# Patient Record
(demographics unavailable — no encounter records)

---

## 2022-07-05 RX ORDER — INSULIN GLARGINE 100 [IU]/ML
INJECTION, SOLUTION SUBCUTANEOUS
COMMUNITY

## 2022-07-15 LAB
ANION GAP SERPL CALC-SCNC: 13 MMOL/L (ref 2–17)
BASOPHILS # BLD AUTO: 0 X10E3/MCL (ref 0–0.2)
BASOPHILS NFR BLD AUTO: 0.4 % (ref 0–2)
BUN SERPL-MCNC: 13 MG/DL (ref 6–20)
CALCIUM SERPL-MCNC: 9.5 MG/DL (ref 8.6–10)
CHLORIDE SERPL-SCNC: 98 MMOL/L (ref 98–107)
CREAT SERPL-MCNC: 0.7 MG/DL (ref 0.7–1.3)
D DIMER: 0.41 CD:295178345 (ref 0.19–0.51)
DEPRECATED HCO3 PLAS-SCNC: 27 MMOL/L (ref 22–29)
EOSINOPHIL # BLD AUTO: 0.2 X10E3/MCL (ref 0–0.5)
EOSINOPHIL NFR BLD AUTO: 4.1 % (ref 0–7)
ERYTHROCYTE [DISTWIDTH] IN BLOOD BY AUTOMATED COUNT: 11.8 % (ref 11–16)
GFR SERPL CREATININE-BSD FRML MDRD: 136 ML/MIN/1.73M²
GLUCOSE SERPL-MCNC: 182 MG/DL (ref 70–99)
HCT VFR BLD AUTO: 43.6 % (ref 38–52)
HGB BLD-MCNC: 15.1 G/DL (ref 13–17.3)
IMMATURE GRANS (ABS): 0.01 X10E3/MCL (ref 0–0.06)
IMMATURE GRANULOCYTES: 0.2 % (ref 0–0.6)
LYMPHOCYTES # SPEC AUTO: 2 X10E3/MCL (ref 1–3.2)
LYMPHOCYTES NFR BLD AUTO: 43.2 % (ref 15–45)
MCH RBC QN AUTO: 29.5 PG (ref 27–34.5)
MCHC RBC AUTO-ENTMCNC: 34.6 G/DL (ref 32–36)
MCV RBC AUTO: 85.3 FL (ref 84–100)
MONOCYTES # BLD AUTO: 0.5 X10E3/MCL (ref 0.3–1)
MONOCYTES NFR BLD AUTO: 9.8 % (ref 4–12)
NEUTROPHILS # BLD AUTO: 1.9 X10E3/MCL (ref 1.6–7.3)
NEUTROPHILS NFR BLD AUTO: 42.3 % (ref 42–74)
OSMOLALITY SERPL CALC.SUM OF ELEC: 280 MOSM/KG (ref 270–287)
PLATELET # BLD AUTO: 270 X10E3/MCL (ref 140–440)
PMV BLD AUTO: 10.1 FL (ref 7.2–13.2)
POTASSIUM SERPL-SCNC: 4.3 MMOL/L (ref 3.5–5.3)
RBC # BLD AUTO: 5.11 X10E6/MCL (ref 4–5.6)
SODIUM SERPL-SCNC: 138 MMOL/L (ref 135–145)
TROPONIN T SERPL-MCNC: <0.01 NG/ML (ref 0–0.01)
WBC # BLD AUTO: 4.6 X10E3/MCL (ref 3.8–10.6)

## 2022-10-19 NOTE — ED PROVIDER NOTES
Chest pain        Patient:   Missy Gomez            MRN: [id number]            FIN: [id number]               Age:   23 years     Sex:  Male     :  2003   Associated Diagnoses:   COVID; Chest pain   Author:   Kasia Jo      Basic Information   Time seen: Provider Seen ()   ED Provider/Time:    QUANG BRISENO / 07/15/2022 15:39  . Additional information: Chief Complaint from Nursing Triage Note   Chief Complaint  Chief Complaint: arrives ambulatory speaking in full sentences. states covid+ monday. now having sharp, intermittment midline CP accompanied with SOB. (07/15/22 14:13:00). History of Present Illness   66-year-old male presents with type 1 diabetes for evaluation of chest pain and shortness of breath. He was diagnosed with COVID 5 days ago. Chest pain and shortness of breath started last night. It is intermittent. There is randomly, can occur at rest.  No specific triggers with exertion or eating. Denies palpitations, lightheadedness, nausea or vomiting, sore throat. Was originally seen at urgent care and referred here for abnormality of EKG. Chest x-ray performed while at urgent care. .        Review of Systems   Constitutional symptoms:  No fever, no chills. Respiratory symptoms:  Shortness of breath, cough. Cardiovascular symptoms:  Chest pain, no palpitations, no syncope. Gastrointestinal symptoms:  No abdominal pain, no nausea, no vomiting. Neurologic symptoms:  No headache, no dizziness. Additional review of systems information: All systems reviewed as documented in chart. Health Status   Allergies: Allergic Reactions (Selected)  No Known Medication Allergies. Past Medical/ Family/ Social History   Surgical history: Reviewed as documented in chart. Family history: Reviewed as documented in chart. Social history: Reviewed as documented in chart.    Problem list:    Active Problems (2)  COVID-19   DM (diabetes mellitus)   , per nurse's notes. Physical Examination               Vital Signs   Vital Signs   8/87/1606 04:17 EDT Systolic Blood Pressure 932 mmHg    Diastolic Blood Pressure 97 mmHg  HI    Peripheral Pulse Rate 73 bpm    Heart Rate Monitored 73 bpm    Respiratory Rate 16 br/min    Mean Arterial Pressure, Cuff 104 mmHg  HI    SpO2 100 %   1/47/2814 75:54 EDT Systolic Blood Pressure 629 mmHg    Diastolic Blood Pressure 72 mmHg    Heart Rate Monitored 84 bpm    Respiratory Rate 18 br/min    SpO2 100 %   1/06/5351 70:83 EDT Systolic Blood Pressure 577 mmHg  HI    Diastolic Blood Pressure 90 mmHg    Temperature Oral 36.6 degC    Heart Rate Monitored 86 bpm    Respiratory Rate 15 br/min    SpO2 99 %   . Measurements   7/15/2022 14:17 EDT Body Mass Index est scarlett 29.42 kg/m2    Body Mass Index Measured 29.42 kg/m2   7/15/2022 14:13 EDT Height/Length Measured 185 cm    Weight Dosing 100.7 kg   . Oxygen saturation. General:  Alert, no acute distress. Skin:  Warm, dry. Head:  Normocephalic, atraumatic. Cardiovascular:  Regular rate and rhythm, No murmur. Respiratory:  Lungs are clear to auscultation, respirations are non-labored, breath sounds are equal.    Neurological:  Alert and oriented to person, place, time, and situation. Psychiatric:  Cooperative, appropriate mood & affect. Medical Decision Making   Differential Diagnosis:  Pulmonary embolism, atypical chest pain, pneumonia, costochondritis, pleurisy. Rationale:  79-year-old male with diabetes who was diagnosed with COVID 5 days ago presents for evaluation of abnormal EKG and chest pain. Was seen in urgent care prior to arrival and referred to the emergency room. Chest x-ray performed at urgent care did not demonstrate any pneumonia or abnormalities. This was reviewed in our system. Patient's vitals are stable, his exam is benign. He is resting comfortably, no respiratory distress, daughter and his girlfriend on the phone.   EKG with inverted T waves, this was reviewed with Dr. Todd Davis likely patient variant. Labs are unremarkable, negative troponin, normal D-dimer. Likely costochondritis secondary to recent viral illness. Will treat with NSAIDs and cough suppressant. They expressed understanding all questions answered. .   Documents reviewed:  Emergency department nurses' notes, prior records. Electrocardiogram:  Normal sinus rhythm, no ectopy, normal WI & QRS intervals, T inversion II, III. Results review:  Lab results : Lab View   7/15/2022 17:42 EDT Estimated Creatinine Clearance 211.25 mL/min   7/15/2022 16:19 EDT WBC 4.6 x10e3/mcL    RBC 5.11 x10e6/mcL    Hgb 15.1 g/dL    HCT 43.6 %    MCV 85.3 fL    MCH 29.5 pg    MCHC 34.6 g/dL    RDW 11.8 %    Platelet 472 N58Y7/BHZ    MPV 10.1 fL    Neutro Auto 42.3 %    Neutro Absolute 1.9 x10e3/mcL    Immature Grans Percent 0.2 %    Immature Grans Absolute 0.01 x10e3/mcL    Lymph Auto 43.2 %    Lymph Absolute 2.0 x10e3/mcL    Mono Auto 9.8 %    Mono Absolute 0.5 x10e3/mcL    Eosinophil Percent 4.1 %    Eos Absolute 0.2 x10e3/mcL    Basophil Auto 0.4 %    Baso Absolute 0.0 x10e3/mcL    D Dimer 0.41 mcg/mL FEU    Sodium Lvl 138 mmol/L    Potassium Lvl 4.3 mmol/L    Chloride 98 mmol/L    CO2 27 mmol/L    Glucose Random 182 mg/dL  HI    BUN 13 mg/dL    Creatinine Lvl 0.7 mg/dL    AGAP 13 mmol/L    Osmolality Calc 280 mOsm/kg    Calcium Lvl 9.5 mg/dL    eGFR 136 mL/min/1.73mÂ²    Trop T Quant <0.010 ng/mL   . Radiology results:  Rad Results (ST)   No qualifying data available. .      Impression and Plan   Diagnosis   COVID (DQN58-UN U07.1, Discharge, Medical)   Chest pain (NKH15-BF R07.9, Discharge, Medical)   Plan   Condition: Stable. Disposition: Discharged: Time  7/15/2022 17:45:00, to home. Patient was given the following educational materials: Costochondritis, Easy-to-Read.     Follow up with: PCP or clinic Within 1 to 2 days Follow-up with primary care provider in 1-2 days f symptoms worsening or not improving. Return to ED if symptoms worsen. .    Counseled: Patient, Family, Regarding diagnosis, Regarding diagnostic results, Regarding treatment plan, Patient indicated understanding of instructions. Signature Line     Electronically Signed on 07/15/2022 05:49 PM EDT   ________________________________________________   Florecita Tian               Modified by: Florecita Tian on 07/15/2022 05:46 PM EDT      Modified by: Florecita Tian on 07/15/2022 05:49 PM EDTAddendum by Dyana Johnson on July 15, 2022 22:16 EDT               The patients history, exam findings, diagnostics, and a summary of any interventions or procedures was reviewed in detail with PA.   Signature Line     Electronically Signed on 07/15/2022 10:16 PM EDT   ________________________________________________   Gillian HARRIS               Modified by: Gillian HARRIS on 07/15/2022 10:16 PM EDT

## 2022-10-19 NOTE — ED NOTES
ED Patient Summary       ;          [facility]  [address], [place], [address] [place] [address]  [phone]  Discharge Instructions (Patient)  Ashley Ulloa  :  2003                   MRN: [id number]                   FIN: [id number]  Reason For Visit: Chest pain; SOB - Shortness of breath; Chest pain; ABN EKG  Final Diagnosis: COVID; Chest pain     Visit Date: 7/15/2022 14:08:00  Address: [facility]  Phone: [phone]     Emergency Department Providers:         Primary Physician:      Efra England would like to thank you for allowing us to assist you with your healthcare needs. The following includes patient education materials and information regarding your injury/illness. Follow-up Instructions: You were seen today on an emergency basis. Please contact your primary care doctor for a follow up appointment. If you received a referral to a specialist doctor, it is important you follow-up as instructed. It is important that you call your follow-up doctor to schedule and confirm the location of your next appointment. Your doctor may practice at multiple locations. The office location of your follow-up appointment may be different to the one written on your discharge instructions. If you do not have a primary care doctor, please call [phone] for help in finding a Korey Crowell. [facility] Provider. For help in finding a specialist doctor, please call ..[phone]. If your condition gets worse before your follow-up with your primary care doctor or specialist, please return to the Emergency Department. Coronavirus 2019 (COVID-19) Reminders:     Patients age 15 - 24, with parental consent, and over age 25 can make an appointment for a COVID-19 vaccine.  Patients can contact their [facility] doctors' offices to schedule an appointment to receive the COVID-19 vaccine. Patients who do not have a [address] [facility] physician can call [phone] to schedule vaccination appointments. Follow Up Appointments:  Primary Care Provider:     Name: Michele CARVER     Phone: [phone]                 With: Address: When:   PCP or clinic  Within 1 to 2 days   Comments: Follow-up with primary care provider in 1-2 days f symptoms worsening or not improving. Return to ED if symptoms worsen. [address] SERVICES%>       Allergy Info: No Known Medication Allergies     Discharge Additional Information          Discharge Patient 07/15/22 17:46:00 EDT      Patient Education Materials:        Costochondritis      Costochondritis is irritation and swelling (inflammation) of the tissue that connects the ribs to the breastbone (sternum). This tissue is called cartilage. Costochondritis causes pain in the front of the chest. Usually, the pain:   Starts slowly. Is in more than one rib. What are the causes? The exact cause of this condition is not always known. It results from stress on the tissue in the affected area. The cause of this stress could be:   Chest injury. Exercise or activity, such as lifting. Very bad coughing. What increases the risk? You are more likely to develop this condition if you:   Are female. Are 32?36 years old. Recently started a new exercise or work activity. Have low levels of vitamin D. Have a condition that makes you cough often. What are the signs or symptoms? The main symptom of this condition is chest pain. The pain:   Usually starts slowly and can be sharp or dull. Gets worse with deep breathing, coughing, or exercise. Gets better with rest.      May be worse when you press on the affected area of your ribs and breastbone. How is this treated? This condition usually goes away on its own over time.  Your doctor may prescribe an NSAID, such as ibuprofen. This can help reduce pain and inflammation. Treatment may also include:   Resting and avoiding activities that make pain worse. Putting heat or ice on the painful area. Doing exercises to stretch your chest muscles. If these treatments do not help, your doctor may inject a numbing medicine to help relieve the pain. Follow these instructions at home:    Managing pain, stiffness, and swelling         If told, put ice on the painful area. To do this:  ? Put ice in a plastic bag.    ? Place a towel between your skin and the bag.    ? Leave the ice on for 20 minutes, 2?3 times a day. If told, put heat on the affected area. Do this as often as told by your doctor. Use the heat source that your doctor recommends, such as a moist heat pack or a heating pad.  ? Place a towel between your skin and the heat source. ? Leave the heat on for 20?30 minutes. ? Take off the heat if your skin turns bright red. This is very important if you cannot feel pain, heat, or cold. You may have a greater risk of getting burned. Activity     Rest as told by your doctor. Do not do anything that makes your pain worse. This includes any activities that use chest, belly (abdomen), and side muscles. Do not lift anything that is heavier than 10 lb (4.5 kg), or the limit that you are told, until your doctor says that it is safe. Return to your normal activities as told by your doctor. Ask your doctor what activities are safe for you. General instructions     Take over-the-counter and prescription medicines only as told by your doctor. Keep all follow-up visits as told by your doctor. This is important. Contact a doctor if:     You have chills or a fever. Your pain does not go away or it gets worse. You have a cough that does not go away. Get help right away if:     You are short of breath.      You have very bad chest pain that is not helped by medicines, heat, or ice. These symptoms may be an emergency. Do not wait to see if the symptoms will go away. Get medical help right away. Call your local emergency services (911 in the [place] Do not drive yourself to the hospital.      Summary     Costochondritis is irritation and swelling (inflammation) of the tissue that connects the ribs to the breastbone (sternum). This condition causes pain in the front of the chest.     Treatment may include medicines, rest, heat or ice, and exercises. This information is not intended to replace advice given to you by your health care provider. Make sure you discuss any questions you have with your health care provider. Document Revised: 10/30/2020 Document Reviewed: 10/30/2020  [place] Patient Education ? [facility]      ---------------------------------------------------------------------------------------------------------------------  [facility] allows patients to review your COVID and other test results as well as discharge documents from any [facility]. [facility], Emergency Department, surgical center or outpatient lab. Test results are typically available 36 hours after the test is completed. [address] encourages you to self-enroll in the [facility] Patient Portal.     To begin your self-enrollment process, please visit [email or web address] Under [facility], click on Sign up now. NOTE: You must be 16 years and older to use [facility] Self-Enroll online. If you are a parent, caregiver, or guardian; you need an invite to access your childs or dependents health records. To obtain an invite, contact the Medical Records department at [phone] Monday through Friday, 8-4:30, select option 3 . If we receive your call afterhours, we will return your call the next business day.      If you have issues trying to create or access your account, contact [facility] support at [phone] available 7 days a week 24 hours a day.      Comment: [General Appearance - Alert] : alert [General Appearance - In No Acute Distress] : in no acute distress [] : no respiratory distress [Skin Color & Pigmentation] : normal skin color and pigmentation [Oriented To Time, Place, And Person] : oriented to person, place, and time [Normal] : normal heart rate and rhythm, normal S1 and S2, and no murmurs present [No UE Edema] : there is no upper extremity edema [de-identified] : left breast scar well healed

## 2022-10-19 NOTE — ED NOTES
ED Triage Note       ED Secondary Triage Entered On:  7/15/2022 16:12 EDT    Performed On:  7/15/2022 16:12 EDT by Bryce Jeronimo               General Information   Barriers to Learning :   None evident   ED Home Meds Section :   Document assessment   [facility] ED Fall Risk Section :   Document assessment   ED Advance Directives Section :   Document assessment   ED Palliative Screen :   N/A (prefilled for <64yo)   Bryce Jeronimo - 7/15/2022 16:12 EDT   (As Of: 7/15/2022 16:15:07 EDT)   Problems(Active)    COVID-19 (SNOMED CT  :0757986093 )  Name of Problem:   LPKWH-22 ; Recorder:   Rigo Jimenez RN, Guanaco Palmer; Confirmation:   Confirmed ; Classification:   Patient Stated ; Code:   [id number] ; Contributor System:   [place] [employer] ; Last Updated:   7/15/2022 14:15 EDT ; Life Cycle Date:   7/15/2022 ; Life Cycle Status:   Active ; Vocabulary:   SNOMED CT        DM (diabetes mellitus) (SNOMED CT  :026440949 )  Name of Problem:   DM (diabetes mellitus) ; Recorder:   Bryce Jeronimo; Confirmation:   Confirmed ; Classification:   Patient Stated ; Code:   [id number] ; Contributor System:   PowerChart ; Last Updated:   7/15/2022 16:15 EDT ;  Life Cycle Date:   7/15/2022 ; Life Cycle Status:   Active ; Vocabulary:   SNOMED CT          Diagnoses(Active)    Chest pain  Date:   7/15/2022 ; Diagnosis Type:   Reason For Visit ; Confirmation:   Complaint of ; Clinical Dx:   Chest pain ; Classification:   Medical ; Clinical Service:   Non-Specified ; Code:   PNED ; Probability:   0 ; Diagnosis Code:   [id number]      SOB - Shortness of breath  Date:   7/15/2022 ; Diagnosis Type:   Reason For Visit ; Confirmation:   Complaint of ; Clinical Dx:   SOB - Shortness of breath ; Classification:   Medical ; Clinical Service:   Non-Specified ; Code:   PNED ; Probability:   0 ; Diagnosis Code:   [id number]             -    Procedure History   (As Of: 7/15/2022 16:15:07 EDT)     [facility]

## 2022-10-19 NOTE — ED NOTES
ED Triage Note       ED Triage Adult Entered On:  7/15/2022 14:17 EDT    Performed On:  7/15/2022 14:13 EDT by Aminah Bright RN, Kimberlee ROSAS               Triage   Numeric Rating Pain Scale :   0 = No pain   Chief Complaint :   arrives ambulatory speaking in full sentences. states covid+ monday. now having sharp, intermittment midline CP accompanied with SOB.     [employer] Mode of Arrival :   Private vehicle   Infectious Disease Documentation :   Document assessment   Temperature Oral :   36.6 degC(Converted to: 97.9 degF)    Heart Rate Monitored :   86 bpm   Respiratory Rate :   15 br/min   Systolic Blood Pressure :   150 mmHg (HI)    Diastolic Blood Pressure :   90 mmHg   SpO2 :   99 %   Oxygen Therapy :   Room air   Patient presentation :   None of the above   Chief Complaint or Presentation suggest infection :   No   Dosing Weight Obtained By :   Measured   Weight Dosing :   100.7 kg(Converted to: 222 lb 0 oz)    Height :   185 cm(Converted to: 6 ft 1 in)    Body Mass Index Dosing :   29 kg/m2   Jonathan Hi - 7/15/2022 14:13 EDT   DCP GENERIC CODE   Tracking Acuity :   3   Tracking Group :   [facility]   Jonathan Sussy - 7/15/2022 14:13 EDT   ED General Section :   Document assessment   Pregnancy Status :   N/A   ED Allergies Section :   Document assessment   ED Reason for Visit Section :   Document assessment   Jonathan Sussy - 7/15/2022 14:13 EDT   ID Risk Screen Symptoms   Recent Travel History :   No recent travel   TB Symptom Screen :   No symptoms   Last 90 days COVID-19 ID :   No   Close Contact with COVID-19 ID :   No   Last 14 days COVID-19 ID :   Yes - Detected (positive)   C. diff Symptom/History ID :   Neither of the above   Patient Pregnant :   None of the above   Aminah Bright RN, Loki ROSAS - 7/15/2022 14:13 EDT   Allergies   (As Of: 7/15/2022 14:17:58 EDT)   Allergies (Active)   No Known Medication Allergies  Estimated Onset Date:   Unspecified ; Created ByMellisa Peralta DEEPTI, Tiffany ROSAS; Reaction Status:   Active ; Category:   Drug ; Substance:   No Known Medication Allergies ; Type: Allergy ; Updated By:   Dhruv Snow RN, [employer]; Reviewed Date:   7/15/2022 14:14 EDT        Psycho-Social   Last 3 mo, thoughts killing self/others :   Patient denies   Right click within box for Suspected Abuse policy link. :   None   Feels Safe Where Live :   Yes   ED Behavioral Activity Rating Scale :   4 - Quiet and awake (normal level of activity)   Naomie Ohara RN, [employer]ivis Prime - 7/15/2022 14:13 EDT   ED Reason for Visit   (As Of: 7/15/2022 14:17:58 EDT)   Problems(Active)    COVID-19 (SNOMED CT  :4153871283 )  Name of Problem:   MARLYG-71 ; Recorder:   Dhruv Snow RN, [facility]; Confirmation:   Confirmed ; Classification:   Patient Stated ; Code:   [id number] ; Contributor System:   [employer] ; Last Updated:   7/15/2022 14:15 EDT ;  Life Cycle Date:   7/15/2022 ; Life Cycle Status:   Active ; Vocabulary:   SNOMED CT          Diagnoses(Active)    Chest pain  Date:   7/15/2022 ; Diagnosis Type:   Reason For Visit ; Confirmation:   Complaint of ; Clinical Dx:   Chest pain ; Classification:   Medical ; Clinical Service:   Non-Specified ; Code:   PNED ; Probability:   0 ; Diagnosis Code:   [id number]      SOB - Shortness of breath  Date:   7/15/2022 ; Diagnosis Type:   Reason For Visit ; Confirmation:   Complaint of ; Clinical Dx:   SOB - Shortness of breath ; Classification:   Medical ; Clinical Service:   Non-Specified ; Code:   PNED ; Probability:   0 ; Diagnosis Code:   [id number]

## 2022-10-19 NOTE — DISCHARGE SUMMARY
ED Clinical Summary                         [facility]  [address] [facility], [address] [phone]           PERSON INFORMATION  Name: Leesa Abarca Age:  23 Years : 2003   Sex: Male Language: English PCP: Sol CARVER   Marital Status:  Single Phone: [phone] Med Service: MED-Medicine   MRN:  [id number] Acct# [de-identified] Arrival: 7/15/2022 14:08:00   Visit Reason: Chest pain; SOB - Shortness of breath; Chest pain; ABN EKG Acuity: 3 LOS: 000 04:08   Address:      [place] [address]  Diagnosis:      COVID; Chest pain  Printed Prescriptions: Allergies      No Known Medication Allergies      Medications Administered During Visit:        Patient Medication List:              No Medications Documented         Major Tests and Procedures: The following procedures and tests were performed during your ED visit. COMMONPROCEDURES%>  COMMON PROCEDURESCOMMENTS%>          Laboratory Orders  Name Status Details   BMP Completed Blood, Stat, ST - Stat, 07/15/22 15:51:00 EDT, 07/15/22 15:51:00 EDT, FINN Zheng LUNDY-PA, Print label Y/N   CBCDIFF Completed Blood, Stat, ST - Stat, 07/15/22 15:51:00 EDT, 07/15/22 15:51:00 EDT, FINN Zheng LUNDY-PA, Print label Y/N   D Dimer Completed Blood, Stat, ST - Stat, 07/15/22 15:51:00 EDT, 07/15/22 15:51:00 EDT, FINN Zheng LUNDY-PA, Print label Y/N   Trop T Completed Blood, Stat, ST - Stat, 07/15/22 15:51:00 EDT, 07/15/22 15:51:00 EDT, FINN Zheng LUNDY-PA, Print label Y/N               Radiology Orders  No radiology orders were placed. Patient Care Orders  Name Status Details   Blood Pressure Completed 07/15/22 15:51:00 EDT, Once, 07/15/22 15:51:00 EDT, Check BP both arms   COVID-19 Status Ordered 07/15/22 14:08:19 EDT, NOT VALID FOR pharmacy, laboratory, radiology. , 07/15/22 14:08:19 EDT, COVID-19 Detected   Cardiac/NIBP/Pulse Ox Monitoring Completed 07/15/22 15:51:00 EDT, This message can only be seen by Nursing, it is not visible to Pharmacy, Laboratory, or Radiology. , 07/15/22 15:51:00 EDT, 07/15/22 15:51:00 EDT, Once   Discharge Patient Ordered 07/15/22 17:46:00 EDT   ED Assessment Adult Completed 07/15/22 14:17:59 EDT, 07/15/22 14:17:59 EDT   ED Only Oxygen Therapy Ordered 07/15/22 15:51:00 EDT, STAT 1 hour or less, 07/15/22 15:51:00 EDT, Keep SAT > 92%   ED Secondary Triage Completed 07/15/22 14:17:59 EDT, 07/15/22 14:17:59 EDT   ED Triage Adult Completed 07/15/22 14:08:19 EDT, 07/15/22 14:08:19 EDT   Place Isolation Order Ordered 07/15/22 14:08:20 EDT, 07/15/22 14:08:20 EDT   Saline Lock Insert Completed 07/15/22 15:51:00 EDT, Once, 07/15/22 15:51:00 EDT             PROVIDER INFORMATION               Provider Role Assigned Nadia Macdonald ED MidLevel 7/15/2022 15:39:37    Teresa Mass ED Nurse 7/15/2022 15:52:05        Attending Physician:  QUANG Bunn     Consulting Doc       VITALS INFORMATION  Vital Sign Triage Latest   Temp Oral ORAL_1%>36.6 degC ORAL%>36.6 degC   Temp Temporal TEMPORAL_1%> TEMPORAL%>   Temp Intravascular INTRAVASCULAR_1%> INTRAVASCULAR%>   Temp Axillary AXILLARY_1%> AXILLARY%>   Temp Rectal RECTAL_1%> RECTAL%>   02 Sat 99 % 99 %   Respiratory Rate RATE_1%>15 br/min RATE%>16 br/min   Peripheral Pulse Rate PULSE RATE_1%>73 bpm PULSE RATE%>72 bpm   Apical Heart Rate HEART RATE_1%> HEART RATE%>   Blood Pressure BLOOD PRESSURE_1%>/ BLOOD PRESSURE_1%>90 mmHg BLOOD PRESSURE%>129 mmHg / BLOOD PRESSURE%>80 mmHg                 Immunizations      No Immunizations Documented This Visit          DISCHARGE INFORMATION   Discharge Disposition: H Outpt-Sent Home   Discharge Location:    Home   Discharge Date and Time:    7/15/2022 18:16:00   ED Checkout Date and Time:    7/15/2022 18:16:00     DEPART REASON INCOMPLETE INFORMATION               Depart Action Incomplete Reason   Interactive View/I&O Recently assessed               Problems      No Problems Documented              Smoking Status      No Smoking Status Documented         PATIENT EDUCATION INFORMATION  Instructions:       Costochondritis, Easy-to-Read     Follow up:                    With: Address: When:   PCP or clinic  Within 1 to 2 days   Comments: Follow-up with primary care provider in 1-2 days f symptoms worsening or not improving. Return to ED if symptoms worsen. ED PROVIDER DOCUMENTATION     Patient:   Cresencio Gold            MRN: [id number]            FIN: [id number]               Age:   23 years     Sex:  Male     :  2003   Associated Diagnoses:   COVID; Chest pain   Author:   Francois Avila      Basic Information   Time seen: Provider Seen (ST)   ED Provider/Time:    QUANG BRISENO / 07/15/2022 15:39  . Additional information: Chief Complaint from Nursing Triage Note   Chief Complaint  Chief Complaint: arrives ambulatory speaking in full sentences. states covid+ monday. now having sharp, intermittment midline CP accompanied with SOB. (07/15/22 14:13:00). History of Present Illness   26-year-old male presents with type 1 diabetes for evaluation of chest pain and shortness of breath. He was diagnosed with COVID 5 days ago. Chest pain and shortness of breath started last night. It is intermittent. There is randomly, can occur at rest.  No specific triggers with exertion or eating. Denies palpitations, lightheadedness, nausea or vomiting, sore throat. Was originally seen at urgent care and referred here for abnormality of EKG. Chest x-ray performed while at urgent care. .        Review of Systems   Constitutional symptoms:  No fever, no chills. Respiratory symptoms:  Shortness of breath, cough. Cardiovascular symptoms:  Chest pain, no palpitations, no syncope. Gastrointestinal symptoms:  No abdominal pain, no nausea, no vomiting.     Neurologic symptoms:  No headache, no dizziness. Additional review of systems information: All systems reviewed as documented in chart. Health Status   Allergies: Allergic Reactions (Selected)  No Known Medication Allergies. Past Medical/ Family/ Social History   Surgical history: Reviewed as documented in chart. Family history: Reviewed as documented in chart. Social history: Reviewed as documented in chart. Problem list:    Active Problems (2)  COVID-19   DM (diabetes mellitus)   , per nurse's notes. Physical Examination               Vital Signs   Vital Signs   2/60/8933 51:04 EDT Systolic Blood Pressure 177 mmHg    Diastolic Blood Pressure 97 mmHg  HI    Peripheral Pulse Rate 73 bpm    Heart Rate Monitored 73 bpm    Respiratory Rate 16 br/min    Mean Arterial Pressure, Cuff 104 mmHg  HI    SpO2 100 %   1/69/6939 61:95 EDT Systolic Blood Pressure 300 mmHg    Diastolic Blood Pressure 72 mmHg    Heart Rate Monitored 84 bpm    Respiratory Rate 18 br/min    SpO2 100 %   9/17/8631 48:73 EDT Systolic Blood Pressure 720 mmHg  HI    Diastolic Blood Pressure 90 mmHg    Temperature Oral 36.6 degC    Heart Rate Monitored 86 bpm    Respiratory Rate 15 br/min    SpO2 99 %   . Measurements   7/15/2022 14:17 EDT Body Mass Index est scarlett 29.42 kg/m2    Body Mass Index Measured 29.42 kg/m2   7/15/2022 14:13 EDT Height/Length Measured 185 cm    Weight Dosing 100.7 kg   . Oxygen saturation. General:  Alert, no acute distress. Skin:  Warm, dry. Head:  Normocephalic, atraumatic. Cardiovascular:  Regular rate and rhythm, No murmur. Respiratory:  Lungs are clear to auscultation, respirations are non-labored, breath sounds are equal.    Neurological:  Alert and oriented to person, place, time, and situation. Psychiatric:  Cooperative, appropriate mood & affect. Medical Decision Making   Differential Diagnosis:  Pulmonary embolism, atypical chest pain, pneumonia, costochondritis, pleurisy. Rationale:  17-year-old male with diabetes who was diagnosed with COVID 5 days ago presents for evaluation of abnormal EKG and chest pain. Was seen in urgent care prior to arrival and referred to the emergency room. Chest x-ray performed at urgent care did not demonstrate any pneumonia or abnormalities. This was reviewed in our system. Patient's vitals are stable, his exam is benign. He is resting comfortably, no respiratory distress, daughter and his girlfriend on the phone. EKG with inverted T waves, this was reviewed with Dr. Nannette Moses likely patient variant. Labs are unremarkable, negative troponin, normal D-dimer. Likely costochondritis secondary to recent viral illness. Will treat with NSAIDs and cough suppressant. They expressed understanding all questions answered. .   Documents reviewed:  Emergency department nurses' notes, prior records. Electrocardiogram:  Normal sinus rhythm, no ectopy, normal FL & QRS intervals, T inversion II, III. Results review:  Lab results : Lab View   7/15/2022 17:42 EDT Estimated Creatinine Clearance 211.25 mL/min   7/15/2022 16:19 EDT WBC 4.6 x10e3/mcL    RBC 5.11 x10e6/mcL    Hgb 15.1 g/dL    HCT 43.6 %    MCV 85.3 fL    MCH 29.5 pg    MCHC 34.6 g/dL    RDW 11.8 %    Platelet 702 N87D4/CSK    MPV 10.1 fL    Neutro Auto 42.3 %    Neutro Absolute 1.9 x10e3/mcL    Immature Grans Percent 0.2 %    Immature Grans Absolute 0.01 x10e3/mcL    Lymph Auto 43.2 %    Lymph Absolute 2.0 x10e3/mcL    Mono Auto 9.8 %    Mono Absolute 0.5 x10e3/mcL    Eosinophil Percent 4.1 %    Eos Absolute 0.2 x10e3/mcL    Basophil Auto 0.4 %    Baso Absolute 0.0 x10e3/mcL    D Dimer 0.41 mcg/mL FEU    Sodium Lvl 138 mmol/L    Potassium Lvl 4.3 mmol/L    Chloride 98 mmol/L    CO2 27 mmol/L    Glucose Random 182 mg/dL  HI    BUN 13 mg/dL    Creatinine Lvl 0.7 mg/dL    AGAP 13 mmol/L    Osmolality Calc 280 mOsm/kg    Calcium Lvl 9.5 mg/dL    eGFR 136 mL/min/1.73mÂ²    Trop T Quant <0.010 ng/mL   . Radiology results:  Rad Results (ST)   No qualifying data available. .      Impression and Plan   Diagnosis   COVID (UEB93-OA U07.1, Discharge, Medical)   Chest pain (MWL36-FC R07.9, Discharge, Medical)   Plan   Condition: Stable. Disposition: Discharged: Time  7/15/2022 17:45:00, to home. Patient was given the following educational materials: Costochondritis, Easy-to-Read. Follow up with: PCP or clinic Within 1 to 2 days Follow-up with primary care provider in 1-2 days f symptoms worsening or not improving. Return to ED if symptoms worsen. .    Counseled: Patient, Family, Regarding diagnosis, Regarding diagnostic results, Regarding treatment plan, Patient indicated understanding of instructions.

## 2022-10-19 NOTE — ED NOTES
ED Patient Education Note     Patient Education Materials Follows:  Orthopedics     Costochondritis      Costochondritis is irritation and swelling (inflammation) of the tissue that connects the ribs to the breastbone (sternum). This tissue is called cartilage. Costochondritis causes pain in the front of the chest. Usually, the pain:   Starts slowly. Is in more than one rib. What are the causes? The exact cause of this condition is not always known. It results from stress on the tissue in the affected area. The cause of this stress could be:   Chest injury. Exercise or activity, such as lifting. Very bad coughing. What increases the risk? You are more likely to develop this condition if you:   Are female. Are 32?36 years old. Recently started a new exercise or work activity. Have low levels of vitamin D. Have a condition that makes you cough often. What are the signs or symptoms? The main symptom of this condition is chest pain. The pain:   Usually starts slowly and can be sharp or dull. Gets worse with deep breathing, coughing, or exercise. Gets better with rest.      May be worse when you press on the affected area of your ribs and breastbone. How is this treated? This condition usually goes away on its own over time. Your doctor may prescribe an NSAID, such as ibuprofen. This can help reduce pain and inflammation. Treatment may also include:   Resting and avoiding activities that make pain worse. Putting heat or ice on the painful area. Doing exercises to stretch your chest muscles. If these treatments do not help, your doctor may inject a numbing medicine to help relieve the pain. Follow these instructions at home:    Managing pain, stiffness, and swelling         If told, put ice on the painful area. To do this:  ? Put ice in a plastic bag.    ?  Place a towel between your skin and the bag.    ? Leave the ice on for 20 minutes, 2?3 times a day. If told, put heat on the affected area. Do this as often as told by your doctor. Use the heat source that your doctor recommends, such as a moist heat pack or a heating pad.  ? Place a towel between your skin and the heat source. ? Leave the heat on for 20?30 minutes. ? Take off the heat if your skin turns bright red. This is very important if you cannot feel pain, heat, or cold. You may have a greater risk of getting burned. Activity     Rest as told by your doctor. Do not do anything that makes your pain worse. This includes any activities that use chest, belly (abdomen), and side muscles. Do not lift anything that is heavier than 10 lb (4.5 kg), or the limit that you are told, until your doctor says that it is safe. Return to your normal activities as told by your doctor. Ask your doctor what activities are safe for you. General instructions     Take over-the-counter and prescription medicines only as told by your doctor. Keep all follow-up visits as told by your doctor. This is important. Contact a doctor if:     You have chills or a fever. Your pain does not go away or it gets worse. You have a cough that does not go away. Get help right away if:     You are short of breath. You have very bad chest pain that is not helped by medicines, heat, or ice. These symptoms may be an emergency. Do not wait to see if the symptoms will go away. Get medical help right away. Call your local emergency services (911 in the [place] Do not drive yourself to the hospital.      Summary     Costochondritis is irritation and swelling (inflammation) of the tissue that connects the ribs to the breastbone (sternum). This condition causes pain in the front of the chest.     Treatment may include medicines, rest, heat or ice, and exercises.       This information is not intended to replace advice given to you by your health care provider. Make sure you discuss any questions you have with your health care provider. Document Revised: 10/30/2020 Document Reviewed: 10/30/2020  [employer]vier Patient Education ?  [address] [facility].